# Patient Record
Sex: FEMALE | Race: WHITE | Employment: FULL TIME | URBAN - NONMETROPOLITAN AREA
[De-identification: names, ages, dates, MRNs, and addresses within clinical notes are randomized per-mention and may not be internally consistent; named-entity substitution may affect disease eponyms.]

---

## 2021-10-11 LAB
ABO, EXTERNAL RESULT: NORMAL
C. TRACHOMATIS, EXTERNAL RESULT: NEGATIVE
GBS, EXTERNAL RESULT: NEGATIVE
HEP B, EXTERNAL RESULT: NEGATIVE
HIV, EXTERNAL RESULT: NORMAL
N. GONORRHOEAE, EXTERNAL RESULT: NEGATIVE
RH FACTOR, EXTERNAL RESULT: POSITIVE
RPR, EXTERNAL RESULT: NORMAL
RUBELLA TITER, EXTERNAL RESULT: NORMAL

## 2022-05-09 ENCOUNTER — ANESTHESIA EVENT (OUTPATIENT)
Dept: LABOR AND DELIVERY | Age: 27
End: 2022-05-09
Payer: COMMERCIAL

## 2022-05-09 ENCOUNTER — HOSPITAL ENCOUNTER (INPATIENT)
Age: 27
LOS: 2 days | Discharge: HOME | End: 2022-05-11
Attending: OBSTETRICS & GYNECOLOGY | Admitting: OBSTETRICS & GYNECOLOGY
Payer: COMMERCIAL

## 2022-05-09 ENCOUNTER — ANESTHESIA (OUTPATIENT)
Dept: LABOR AND DELIVERY | Age: 27
End: 2022-05-09
Payer: COMMERCIAL

## 2022-05-09 PROBLEM — Z34.90 TERM PREGNANCY: Status: ACTIVE | Noted: 2022-05-09

## 2022-05-09 LAB
ABSOLUTE EOS #: 0.07 K/UL (ref 0–0.44)
ABSOLUTE IMMATURE GRANULOCYTE: 0.05 K/UL (ref 0–0.3)
ABSOLUTE LYMPH #: 2.41 K/UL (ref 1.1–3.7)
ABSOLUTE MONO #: 0.55 K/UL (ref 0.1–1.2)
AMPHETAMINE SCREEN URINE: NEGATIVE
BARBITURATE SCREEN URINE: NEGATIVE
BASOPHILS # BLD: 0 % (ref 0–2)
BASOPHILS ABSOLUTE: <0.03 K/UL (ref 0–0.2)
BENZODIAZEPINE SCREEN, URINE: NEGATIVE
BUPRENORPHINE URINE: NEGATIVE
CANNABINOID SCREEN URINE: NEGATIVE
COCAINE METABOLITE, URINE: NEGATIVE
EOSINOPHILS RELATIVE PERCENT: 1 % (ref 1–4)
GLUCOSE BLD-MCNC: 60 MG/DL (ref 74–100)
HCT VFR BLD CALC: 38.3 % (ref 36.3–47.1)
HEMOGLOBIN: 12.6 G/DL (ref 11.9–15.1)
IMMATURE GRANULOCYTES: 1 %
LYMPHOCYTES # BLD: 27 % (ref 24–43)
MCH RBC QN AUTO: 30.5 PG (ref 25.2–33.5)
MCHC RBC AUTO-ENTMCNC: 32.9 G/DL (ref 28.4–34.8)
MCV RBC AUTO: 92.7 FL (ref 82.6–102.9)
METHADONE SCREEN, URINE: NEGATIVE
METHAMPHETAMINE, URINE: NEGATIVE
MONOCYTES # BLD: 6 % (ref 3–12)
NRBC AUTOMATED: 0 PER 100 WBC
OPIATES, URINE: NEGATIVE
OXYCODONE SCREEN URINE: NEGATIVE
PDW BLD-RTO: 14 % (ref 11.8–14.4)
PHENCYCLIDINE, URINE: NEGATIVE
PLATELET # BLD: ABNORMAL K/UL (ref 138–453)
PLATELET, FLUORESCENCE: 148 K/UL (ref 138–453)
PLATELET, IMMATURE FRACTION: 7.3 % (ref 1.1–10.3)
PROPOXYPHENE, URINE: NEGATIVE
RBC # BLD: 4.13 M/UL (ref 3.95–5.11)
SEG NEUTROPHILS: 65 % (ref 36–65)
SEGMENTED NEUTROPHILS ABSOLUTE COUNT: 5.7 K/UL (ref 1.5–8.1)
TRICYCLIC ANTIDEPRESSANTS, UR: NEGATIVE
WBC # BLD: 8.8 K/UL (ref 3.5–11.3)

## 2022-05-09 PROCEDURE — 2580000003 HC RX 258: Performed by: MIDWIFE

## 2022-05-09 PROCEDURE — 80306 DRUG TEST PRSMV INSTRMNT: CPT

## 2022-05-09 PROCEDURE — 85025 COMPLETE CBC W/AUTO DIFF WBC: CPT

## 2022-05-09 PROCEDURE — 59200 INSERT CERVICAL DILATOR: CPT

## 2022-05-09 PROCEDURE — 6360000002 HC RX W HCPCS: Performed by: MIDWIFE

## 2022-05-09 PROCEDURE — 6370000000 HC RX 637 (ALT 250 FOR IP): Performed by: MIDWIFE

## 2022-05-09 PROCEDURE — 6360000002 HC RX W HCPCS: Performed by: NURSE ANESTHETIST, CERTIFIED REGISTERED

## 2022-05-09 PROCEDURE — 1220000000 HC SEMI PRIVATE OB R&B

## 2022-05-09 PROCEDURE — 82947 ASSAY GLUCOSE BLOOD QUANT: CPT

## 2022-05-09 RX ORDER — SEVOFLURANE 250 ML/250ML
1 LIQUID RESPIRATORY (INHALATION) CONTINUOUS PRN
Status: DISCONTINUED | OUTPATIENT
Start: 2022-05-09 | End: 2022-05-10

## 2022-05-09 RX ORDER — LAMOTRIGINE 100 MG/1
300 TABLET ORAL 2 TIMES DAILY
COMMUNITY
Start: 2022-04-29

## 2022-05-09 RX ORDER — ACETAMINOPHEN 325 MG/1
650 TABLET ORAL EVERY 4 HOURS PRN
Status: DISCONTINUED | OUTPATIENT
Start: 2022-05-09 | End: 2022-05-10

## 2022-05-09 RX ORDER — NALBUPHINE HCL 10 MG/ML
10 AMPUL (ML) INJECTION
Status: DISCONTINUED | OUTPATIENT
Start: 2022-05-09 | End: 2022-05-10

## 2022-05-09 RX ORDER — SODIUM CHLORIDE 9 MG/ML
25 INJECTION, SOLUTION INTRAVENOUS PRN
Status: DISCONTINUED | OUTPATIENT
Start: 2022-05-09 | End: 2022-05-10

## 2022-05-09 RX ORDER — MISOPROSTOL 100 UG/1
900 TABLET ORAL PRN
Status: DISCONTINUED | OUTPATIENT
Start: 2022-05-09 | End: 2022-05-10

## 2022-05-09 RX ORDER — ONDANSETRON 2 MG/ML
4 INJECTION INTRAMUSCULAR; INTRAVENOUS EVERY 6 HOURS PRN
Status: DISCONTINUED | OUTPATIENT
Start: 2022-05-09 | End: 2022-05-10

## 2022-05-09 RX ORDER — SODIUM CHLORIDE 0.9 % (FLUSH) 0.9 %
5-40 SYRINGE (ML) INJECTION EVERY 12 HOURS SCHEDULED
Status: DISCONTINUED | OUTPATIENT
Start: 2022-05-09 | End: 2022-05-10

## 2022-05-09 RX ORDER — SODIUM CHLORIDE, SODIUM LACTATE, POTASSIUM CHLORIDE, AND CALCIUM CHLORIDE .6; .31; .03; .02 G/100ML; G/100ML; G/100ML; G/100ML
1000 INJECTION, SOLUTION INTRAVENOUS PRN
Status: DISCONTINUED | OUTPATIENT
Start: 2022-05-09 | End: 2022-05-10

## 2022-05-09 RX ORDER — SODIUM CHLORIDE 0.9 % (FLUSH) 0.9 %
5-40 SYRINGE (ML) INJECTION PRN
Status: DISCONTINUED | OUTPATIENT
Start: 2022-05-09 | End: 2022-05-10

## 2022-05-09 RX ORDER — NALOXONE HYDROCHLORIDE 0.4 MG/ML
INJECTION, SOLUTION INTRAMUSCULAR; INTRAVENOUS; SUBCUTANEOUS PRN
Status: DISCONTINUED | OUTPATIENT
Start: 2022-05-09 | End: 2022-05-10

## 2022-05-09 RX ORDER — ROPIVACAINE HYDROCHLORIDE 2 MG/ML
10 INJECTION, SOLUTION EPIDURAL; INFILTRATION; PERINEURAL CONTINUOUS
Status: DISCONTINUED | OUTPATIENT
Start: 2022-05-09 | End: 2022-05-10

## 2022-05-09 RX ORDER — SODIUM CHLORIDE, SODIUM LACTATE, POTASSIUM CHLORIDE, AND CALCIUM CHLORIDE .6; .31; .03; .02 G/100ML; G/100ML; G/100ML; G/100ML
500 INJECTION, SOLUTION INTRAVENOUS PRN
Status: DISCONTINUED | OUTPATIENT
Start: 2022-05-09 | End: 2022-05-10

## 2022-05-09 RX ORDER — TRANEXAMIC ACID 100 MG/ML
INJECTION, SOLUTION INTRAVENOUS
Status: DISCONTINUED
Start: 2022-05-09 | End: 2022-05-10 | Stop reason: WASHOUT

## 2022-05-09 RX ORDER — SODIUM CHLORIDE, SODIUM LACTATE, POTASSIUM CHLORIDE, CALCIUM CHLORIDE 600; 310; 30; 20 MG/100ML; MG/100ML; MG/100ML; MG/100ML
INJECTION, SOLUTION INTRAVENOUS CONTINUOUS
Status: DISCONTINUED | OUTPATIENT
Start: 2022-05-09 | End: 2022-05-10

## 2022-05-09 RX ORDER — CARBOPROST TROMETHAMINE 250 UG/ML
250 INJECTION, SOLUTION INTRAMUSCULAR PRN
Status: DISCONTINUED | OUTPATIENT
Start: 2022-05-09 | End: 2022-05-10

## 2022-05-09 RX ORDER — ROPIVACAINE HYDROCHLORIDE 2 MG/ML
INJECTION, SOLUTION EPIDURAL; INFILTRATION; PERINEURAL PRN
Status: DISCONTINUED | OUTPATIENT
Start: 2022-05-09 | End: 2022-05-10 | Stop reason: SDUPTHER

## 2022-05-09 RX ORDER — METHYLERGONOVINE MALEATE 0.2 MG/ML
200 INJECTION INTRAVENOUS PRN
Status: DISCONTINUED | OUTPATIENT
Start: 2022-05-09 | End: 2022-05-10

## 2022-05-09 RX ORDER — LIDOCAINE HYDROCHLORIDE 10 MG/ML
30 INJECTION, SOLUTION EPIDURAL; INFILTRATION; INTRACAUDAL; PERINEURAL PRN
Status: DISCONTINUED | OUTPATIENT
Start: 2022-05-09 | End: 2022-05-10

## 2022-05-09 RX ADMIN — Medication 25 MCG: at 08:10

## 2022-05-09 RX ADMIN — ROPIVACAINE HYDROCHLORIDE 10 ML/HR: 2 INJECTION, SOLUTION EPIDURAL; INFILTRATION at 19:54

## 2022-05-09 RX ADMIN — Medication 25 MCG: at 12:20

## 2022-05-09 RX ADMIN — ROPIVACAINE HYDROCHLORIDE 10 ML: 2 INJECTION, SOLUTION EPIDURAL; INFILTRATION at 19:53

## 2022-05-09 RX ADMIN — Medication 1 MILLI-UNITS/MIN: at 22:30

## 2022-05-09 RX ADMIN — SODIUM CHLORIDE, POTASSIUM CHLORIDE, SODIUM LACTATE AND CALCIUM CHLORIDE: 600; 310; 30; 20 INJECTION, SOLUTION INTRAVENOUS at 16:49

## 2022-05-09 RX ADMIN — SODIUM CHLORIDE, POTASSIUM CHLORIDE, SODIUM LACTATE AND CALCIUM CHLORIDE: 600; 310; 30; 20 INJECTION, SOLUTION INTRAVENOUS at 22:06

## 2022-05-09 ASSESSMENT — PAIN DESCRIPTION - DESCRIPTORS
DESCRIPTORS: CRAMPING;PRESSURE
DESCRIPTORS: CRAMPING

## 2022-05-09 NOTE — ANESTHESIA PRE PROCEDURE
Department of Anesthesiology  Preprocedure Note       Name:  Mak Najera   Age:  27 y.o.  :  1995                                          MRN:  [id number]         Date:  2022      Surgeon: * No surgeons listed *    Procedure: * No procedures listed *    Medications prior to admission:   Prior to Admission medications    Medication Sig Start Date End Date Taking?  Authorizing Provider   Prenatal MV-Min-Fe Fum-FA-DHA (PRENATAL 1 PO) Take 1 tablet by mouth daily   Yes Historical Provider, MD   lamoTRIgine (LAMICTAL) 100 MG tablet Take 300 mg by mouth 2 times daily 300mg BID 22   Historical Provider, MD       Current medications:    Current Facility-Administered Medications   Medication Dose Route Frequency Provider Last Rate Last Admin    [Held by provider] oxytocin (PITOCIN) 30 units in 500 mL infusion  1 jai-units/min IntraVENous Continuous Wynelle Quale, APRN - CNM        lactated ringers bolus  500 mL IntraVENous PRN Wynelle Quale, APRN - CNM        Or    lactated ringers bolus  1,000 mL IntraVENous PRN Wynelle Quale, APRN - CNM        sodium chloride flush 0.9 % injection 5-40 mL  5-40 mL IntraVENous 2 times per day Wynelle Quale, APRN - CNM        sodium chloride flush 0.9 % injection 5-40 mL  5-40 mL IntraVENous PRN Wynelle Quale, APRN - CNM        0.9 % sodium chloride infusion  25 mL IntraVENous PRN Wynelle Quale, APRN - CNM        lidocaine PF 1 % injection 30 mL  30 mL Other PRN Wynelle Quale, APRN - CNM        nalbuphine (NUBAIN) injection 10 mg  10 mg IntraVENous Q2H PRN Wynelle Quale, APRN - CNM        butorphanol (STADOL) injection 1 mg  1 mg IntraVENous Q3H PRN Wynelle Quale, APRN - CNM        nitrous oxide 50% inhalation 1 each  1 each Inhalation Continuous PRN Wynelle Quale, APRN - CNM        ondansetron (ZOFRAN) injection 4 mg  4 mg IntraVENous Q6H PRN Wynelle Quale, APRN - CNM        oxytocin (PITOCIN) 30 units in 500 mL infusion  166 jai-units/min IntraVENous PRN Ambar Coats, APRN - CNM        And    oxytocin (PITOCIN) 10 unit bolus from the bag  999 mL/hr IntraVENous PRN Rolandoanann Casperne, APRN - CNM        methylergonovine (METHERGINE) injection 200 mcg  200 mcg IntraMUSCular PRN Rolandoanaosiel Benne, APRN - CNM        carboprost (HEMABATE) injection 250 mcg  250 mcg IntraMUSCular PRN Jeanann Benne, APRN - CNM        miSOPROStol (CYTOTEC) tablet 900 mcg  900 mcg Rectal PRN Ambar Benne, APRN - CNM        acetaminophen (TYLENOL) tablet 650 mg  650 mg Oral Q4H PRN Rolandoanaosiel Benne, APRN - CNM        witch hazel-glycerin (TUCKS) pad   Topical PRN Ambar Shirleyne, APRN - CNM        benzocaine-menthol (DERMOPLAST) 20-0.5 % spray   Topical PRN Ambar Shirleyne, APRN - CNM        [Held by provider] lactated ringers infusion   IntraVENous Continuous Rolandoanaosiel Shirleyne, APRN -  mL/hr at 05/09/22 1843 Rate Change at 05/09/22 1843    miSOPROStol (CYTOTEC) pre-split tablet TABS 25 mcg  25 mcg Vaginal Q4H Rolandoanaosiel Benne, APRN - CNM   25 mcg at 05/09/22 1220    lactated ringers infusion   IntraVENous Continuous Jeanann Benne, APRN - CNM        lactated ringers bolus  500 mL IntraVENous PRN Ambar Benne, APRN - CNM        Or    lactated ringers bolus  1,000 mL IntraVENous PRN Rolandoanaosiel Shirleyne, APRN - CNM        oxytocin (PITOCIN) 30 units in 500 mL infusion  1 jai-units/min IntraVENous Continuous Jeanann Casperne, APRN - CNM           Allergies:  No Known Allergies    Problem List:    Patient Active Problem List   Diagnosis Code    Term pregnancy Z34.90       Past Medical History:        Diagnosis Date    Seizure [facility])        Past Surgical History:        Procedure Laterality Date    KNEE SURGERY Left 2011    SHOULDER SURGERY Right 2010    TONSILLECTOMY AND ADENOIDECTOMY         Social History:    Social History     Tobacco Use    Smoking status: Never Smoker    Smokeless tobacco: Never Used   Substance Use Topics    Alcohol use: Never                                Counseling given: Not Answered      Vital Signs (Current):   Vitals:    05/09/22 1218 05/09/22 1220 05/09/22 1631 05/09/22 1737   BP: 121/82 121/82 130/83 120/67   Pulse: 86 86 93 83   Resp:  16 20    Temp:  36.6 °C (97.9 °F) 36.7 °C (98.1 °F)    TempSrc:  Oral Oral    Weight:       Height:                                                  BP Readings from Last 3 Encounters:   05/09/22 120/67       NPO Status:                                                                                 BMI:   Wt Readings from Last 3 Encounters:   05/09/22 245 lb (111.1 kg)     Body mass index is 38.37 kg/m². CBC:   Lab Results   Component Value Date    WBC 8.8 05/09/2022    RBC 4.13 05/09/2022    HGB 12.6 05/09/2022    HCT 38.3 05/09/2022    MCV 92.7 05/09/2022    RDW 14.0 05/09/2022    PLT See Reflexed IPF Result 05/09/2022       CMP: No results found for: NA, K, CL, CO2, BUN, CREATININE, GFRAA, AGRATIO, LABGLOM, GLUCOSE, GLU, PROT, CALCIUM, BILITOT, ALKPHOS, AST, ALT    POC Tests: No results for input(s): POCGLU, POCNA, POCK, POCCL, POCBUN, POCHEMO, POCHCT in the last 72 hours.     Coags: No results found for: PROTIME, INR, APTT    HCG (If Applicable): No results found for: PREGTESTUR, PREGSERUM, HCG, HCGQUANT     ABGs: No results found for: PHART, PO2ART, HME7ZYZ, VCY6JBU, BEART, F5PNXUNA     Type & Screen (If Applicable):  No results found for: LABABO, LABRH    Drug/Infectious Status (If Applicable):  No results found for: HIV, HEPCAB    COVID-19 Screening (If Applicable): No results found for: COVID19        Anesthesia Evaluation  Patient summary reviewed and Nursing notes reviewed no history of anesthetic complications:   Airway: Mallampati: II  TM distance: >3 FB   Neck ROM: full  Mouth opening: > = 3 FB Dental: normal exam         Pulmonary:Negative Pulmonary ROS and normal exam                               Cardiovascular:  Exercise tolerance: good (>4 METS),                     Neuro/Psych:   (+) seizures:,             GI/Hepatic/Renal: Neg GI/Hepatic/Renal ROS            Endo/Other: Negative Endo/Other ROS                    Abdominal:             Vascular: negative vascular ROS. Other Findings:             Anesthesia Plan      epidural     ASA 2             Anesthetic plan and risks discussed with patient. Plan discussed with CRNA.                   Bill Montoya APRN - CRNA   5/9/2022

## 2022-05-09 NOTE — PROGRESS NOTES
Patient states she is ready for her epidural.  To room and SVE is 3/60/-1   Order for bolus to start and she may get her epidural.

## 2022-05-09 NOTE — H&P
Department of Obstetrics and Gynecology   Obstetrics History and Physical  H&P Admission Inpatient  Note        CHIEF COMPLAINT:  Elective induction of labor     HISTORY OF PRESENT ILLNESS:      The patient is a 27 y.o. female at 39w0d. OB History        2    Para   1    Term   1            AB        Living   1       SAB        IAB        Ectopic        Molar        Multiple   0    Live Births   1            Patient presents with a chief complaint as above and is being admitted for induction    Estimated Due Date: Estimated Date of Delivery: 22    PRENATAL CARE:    Complicated by: focal seizures in her leg, no issue during this pregnancy     PAST OB HISTORY:  OB History        2    Para   1    Term   1            AB        Living   1       SAB        IAB        Ectopic        Molar        Multiple   0    Live Births   1                Past Medical History:        Diagnosis Date    Seizure [facility])      Past Surgical History:        Procedure Laterality Date    KNEE SURGERY Left 2011    SHOULDER SURGERY Right 2010    TONSILLECTOMY AND ADENOIDECTOMY       Allergies:  Patient has no known allergies.     Social History:    Social History     Socioeconomic History    Marital status:      Spouse name: Not on file    Number of children: Not on file    Years of education: Not on file    Highest education level: Not on file   Occupational History    Not on file   Tobacco Use    Smoking status: Never Smoker    Smokeless tobacco: Never Used   Vaping Use    Vaping Use: Never used   Substance and Sexual Activity    Alcohol use: Never    Drug use: Never    Sexual activity: Never   Other Topics Concern    Not on file   Social History Narrative    Not on file     Social Determinants of Health     Financial Resource Strain:     Difficulty of Paying Living Expenses: Not on file   Food Insecurity:     Worried About [address] in the Last Year: Not on file    Danika troncoso [employer] in the Last Year: Not on file   Transportation Needs:     Lack of Transportation (Medical): Not on file    Lack of Transportation (Non-Medical):  Not on file   Physical Activity:     Days of Exercise per Week: Not on file    Minutes of Exercise per Session: Not on file   Stress:     Feeling of Stress : Not on file   Social Connections:     Frequency of Communication with Friends and Family: Not on file    Frequency of Social Gatherings with Friends and Family: Not on file    Attends Scientologist Services: Not on file    Active Member of Clubs or Organizations: Not on file    Attends Club or Organization Meetings: Not on file    Marital Status: Not on file   Intimate Partner Violence:     Fear of Current or Ex-Partner: Not on file    Emotionally Abused: Not on file    Physically Abused: Not on file    Sexually Abused: Not on file   Housing Stability:     Unable to Pay for Housing in the Last Year: Not on file    Number of [place] in the Last Year: Not on file    Unstable Housing in the Last Year: Not on file     Family History:       Problem Relation Age of Onset    No Known Problems Mother     No Known Problems Father      Medications Prior to Admission:  Medications Prior to Admission: Prenatal MV-Min-Fe Fum-FA-DHA (PRENATAL 1 PO), Take 1 tablet by mouth daily  lamoTRIgine (LAMICTAL) 100 MG tablet, Take 300 mg by mouth 2 times daily 300mg BID    REVIEW OF SYSTEMS:    CONSTITUTIONAL:  negative  RESPIRATORY:  negative  CARDIOVASCULAR:  negative  GASTROINTESTINAL:  negative  ALLERGIC/IMMUNOLOGIC:  negative  NEUROLOGICAL:  negative  BEHAVIOR/PSYCH:  negative    PHYSICAL EXAM:  Vitals:    05/09/22 0711 05/09/22 1218 05/09/22 1220 05/09/22 1631   BP: 129/76 121/82 121/82 130/83   Pulse: 88 86 86 93   Resp: 16  16 20   Temp: 97.9 °F (36.6 °C)  97.9 °F (36.6 °C) 98.1 °F (36.7 °C)   TempSrc: Oral  Oral Oral   Weight: 245 lb (111.1 kg)      Height: 5' 7\" (1.702 m)        General appearance:  awake, alert, cooperative, no apparent distress, and appears stated age  Neurologic:  Awake, alert, oriented to name, place and time. Lungs:  No increased work of breathing, good air exchange  Abdomen:  Soft, non tender, gravid, consistent with her gestational age, EFW by Fausto's mushtaqouever was 8-0 lbs    Fetal heart rate:  Reassuring. Pelvis:  Adequate pelvis  Cervix: fingertip 50% medium -1  Contraction frequency:  3-7 minutes  Per nursing exam     Membranes:  Intact    Labs:  Blood Type: O+ positive     Rubella:  No results found for: RUBG  T. Pallidium, IGG:  No results found for: TREPG  Sickle Cell Screen: No results found for: SICKLE  Hepatitis B Surface Antigen: No results found for: HEPBSAG  HIV:  No results found for: [id number]       Results for orders placed or performed during the hospital encounter of 05/09/22   GBS, External Result   Result Value Ref Range    GBS, External Result NEGATIVE    C. Trachomatis, External Result   Result Value Ref Range    C. Trachomatis, External Result NEGATIVE    N. Gonorrhoeae, External Result   Result Value Ref Range    N.  Gonorrhoeae, External Result NEGATIVE    DRUG SCREEN MULTI URINE   Result Value Ref Range    Amphetamine Screen, Ur NEGATIVE NEGATIVE    Barbiturate Screen, Ur NEGATIVE NEGATIVE    Benzodiazepine Screen, Urine NEGATIVE NEGATIVE    Cocaine Metabolite, Urine NEGATIVE NEGATIVE    Methadone Screen, Urine NEGATIVE NEGATIVE    Opiates, Urine NEGATIVE NEGATIVE    Phencyclidine, Urine NEGATIVE NEGATIVE    Propoxyphene, Urine NEGATIVE NEGATIVE    Cannabinoid Scrn, Ur NEGATIVE NEGATIVE    Oxycodone Screen, Ur NEGATIVE NEGATIVE    Methamphetamine, Urine NEGATIVE NEGATIVE    Tricyclic Antidepressants, Urine NEGATIVE NEGATIVE    Buprenorphine Urine NEGATIVE NEGATIVE   CBC auto differential   Result Value Ref Range    WBC 8.8 3.5 - 11.3 k/uL    RBC 4.13 3.95 - 5.11 m/uL    Hemoglobin 12.6 11.9 - 15.1 g/dL    Hematocrit 38.3 36.3 - 47.1 %    MCV 92.7 82.6 - 102.9 fL MCH 30.5 25.2 - 33.5 pg    MCHC 32.9 28.4 - 34.8 g/dL    RDW 14.0 11.8 - 14.4 %    Platelets See Reflexed IPF Result 138 - 453 k/uL    NRBC Automated 0.0 0.0 per 100 WBC    Seg Neutrophils 65 36 - 65 %    Lymphocytes 27 24 - 43 %    Monocytes 6 3 - 12 %    Eosinophils % 1 1 - 4 %    Basophils 0 0 - 2 %    Immature Granulocytes 1 (H) 0 %    Segs Absolute 5.70 1.50 - 8.10 k/uL    Absolute Lymph # 2.41 1.10 - 3.70 k/uL    Absolute Mono # 0.55 0.10 - 1.20 k/uL    Absolute Eos # 0.07 0.00 - 0.44 k/uL    Basophils Absolute <0.03 0.00 - 0.20 k/uL    Absolute Immature Granulocyte 0.05 0.00 - 0.30 k/uL   Immature Platelet Fraction   Result Value Ref Range    Platelet, Immature Fraction 7.3 1.1 - 10.3 %    Platelet, Fluorescence 148 138 - 453 k/uL   HIV, External Result   Result Value Ref Range    HIV, External Result NON-REACTIVE    RPR, External Lab   Result Value Ref Range    RPR, External Result NON-REACTIVE    Hepatitis B, External Result   Result Value Ref Range    Hep B, External Result NEGATIVE    Rubella Titer, External Result   Result Value Ref Range    Rubella Titer, External Result IMMUNE    Rh Factor, External Result   Result Value Ref Range    Rh Factor, External Result POSITIVE    ABO, External Result   Result Value Ref Range    ABO, External Result O        ASSESSMENT AND PLAN:    Estimated length of stay: less than 2 nights     Active Problems:    * No active hospital problems. *      Labor: Admit, anticipate normal delivery, routine labor orders  Fetus: Reassuring, Cat 1  GBS: No  Other: IV hydration and antiemetics, plan cytotec for cervical ripening and labor induction, pitocin  Dr Ty Garcia aware of patients admission and plan of care.        Shabana , NICKO - KAMILLE,KAMILLE,5/9/2022 4:48 PM

## 2022-05-09 NOTE — FLOWSHEET NOTE
Janette SIMENTAL called for pt update. States not to put in third dose of cytotec. She will come over and AROM if able.

## 2022-05-09 NOTE — PROGRESS NOTES
CNM to room. Patient resting in bed. SVE 3/50/-1  Sterile amniohook used and CNM performs AROM with a return of moderate amount of clear odorless fluid. Small amount of red blood noted from exam prior to ROM. Patient tolerated procedure well. Fetal heart tones withn normal range before, during and after ROM.   No active bleeding noted after ROM and chux pads replaced per RN

## 2022-05-09 NOTE — FLOWSHEET NOTE
PT SITTING UP AT BEDSIDE PLAYING CARDS WITH  AT THIS TIME. DIFFICULTY TRACING EFM D/T MATERNAL POSITION. EFM HANDHELD, ATTEMPTING TO ADJUST EFM WHILE PT SITTING. AUDIBLE AND PALPABLE FETAL MOVEMENT NOTED.

## 2022-05-09 NOTE — FLOWSHEET NOTE
PT SITTING UP ON BIRTHING BALL AT THIS TIME.  EFM ADJUSTED AT THIS TIME. DIFFICULTY TRACING EFM D/T MATERNAL POSITION. AUDIBLE AND PALPABLE FETAL MOVEMENT NOTED AT THIS TIME.

## 2022-05-10 ENCOUNTER — APPOINTMENT (OUTPATIENT)
Dept: GENERAL RADIOLOGY | Age: 27
End: 2022-05-10
Payer: COMMERCIAL

## 2022-05-10 PROCEDURE — 0KQM0ZZ REPAIR PERINEUM MUSCLE, OPEN APPROACH: ICD-10-PCS | Performed by: MIDWIFE

## 2022-05-10 PROCEDURE — 74018 RADEX ABDOMEN 1 VIEW: CPT

## 2022-05-10 PROCEDURE — 2580000003 HC RX 258: Performed by: MIDWIFE

## 2022-05-10 PROCEDURE — 10907ZC DRAINAGE OF AMNIOTIC FLUID, THERAPEUTIC FROM PRODUCTS OF CONCEPTION, VIA NATURAL OR ARTIFICIAL OPENING: ICD-10-PCS | Performed by: MIDWIFE

## 2022-05-10 PROCEDURE — 7200000001 HC VAGINAL DELIVERY

## 2022-05-10 PROCEDURE — 51702 INSERT TEMP BLADDER CATH: CPT

## 2022-05-10 PROCEDURE — 1220000000 HC SEMI PRIVATE OB R&B

## 2022-05-10 PROCEDURE — 6370000000 HC RX 637 (ALT 250 FOR IP): Performed by: MIDWIFE

## 2022-05-10 PROCEDURE — 3E0P7VZ INTRODUCTION OF HORMONE INTO FEMALE REPRODUCTIVE, VIA NATURAL OR ARTIFICIAL OPENING: ICD-10-PCS | Performed by: MIDWIFE

## 2022-05-10 RX ORDER — SODIUM CHLORIDE 0.9 % (FLUSH) 0.9 %
5-40 SYRINGE (ML) INJECTION EVERY 12 HOURS SCHEDULED
Status: DISCONTINUED | OUTPATIENT
Start: 2022-05-10 | End: 2022-05-11 | Stop reason: HOSPADM

## 2022-05-10 RX ORDER — LAMOTRIGINE 100 MG/1
300 TABLET ORAL 2 TIMES DAILY
Status: DISCONTINUED | OUTPATIENT
Start: 2022-05-10 | End: 2022-05-11 | Stop reason: HOSPADM

## 2022-05-10 RX ORDER — METHYLERGONOVINE MALEATE 0.2 MG/ML
200 INJECTION INTRAVENOUS PRN
Status: DISCONTINUED | OUTPATIENT
Start: 2022-05-10 | End: 2022-05-11 | Stop reason: HOSPADM

## 2022-05-10 RX ORDER — ACETAMINOPHEN 500 MG
1000 TABLET ORAL EVERY 8 HOURS PRN
Status: DISCONTINUED | OUTPATIENT
Start: 2022-05-10 | End: 2022-05-11 | Stop reason: HOSPADM

## 2022-05-10 RX ORDER — CARBOPROST TROMETHAMINE 250 UG/ML
250 INJECTION, SOLUTION INTRAMUSCULAR PRN
Status: DISCONTINUED | OUTPATIENT
Start: 2022-05-10 | End: 2022-05-11 | Stop reason: HOSPADM

## 2022-05-10 RX ORDER — SODIUM CHLORIDE 0.9 % (FLUSH) 0.9 %
5-40 SYRINGE (ML) INJECTION PRN
Status: DISCONTINUED | OUTPATIENT
Start: 2022-05-10 | End: 2022-05-11 | Stop reason: HOSPADM

## 2022-05-10 RX ORDER — SODIUM CHLORIDE 9 MG/ML
INJECTION, SOLUTION INTRAVENOUS PRN
Status: DISCONTINUED | OUTPATIENT
Start: 2022-05-10 | End: 2022-05-11 | Stop reason: HOSPADM

## 2022-05-10 RX ORDER — IBUPROFEN 800 MG/1
800 TABLET ORAL EVERY 8 HOURS PRN
Status: DISCONTINUED | OUTPATIENT
Start: 2022-05-10 | End: 2022-05-11 | Stop reason: HOSPADM

## 2022-05-10 RX ORDER — DOCUSATE SODIUM 100 MG/1
100 CAPSULE, LIQUID FILLED ORAL 2 TIMES DAILY PRN
Status: DISCONTINUED | OUTPATIENT
Start: 2022-05-10 | End: 2022-05-11 | Stop reason: HOSPADM

## 2022-05-10 RX ORDER — ONDANSETRON 4 MG/1
8 TABLET, ORALLY DISINTEGRATING ORAL EVERY 8 HOURS PRN
Status: DISCONTINUED | OUTPATIENT
Start: 2022-05-10 | End: 2022-05-11 | Stop reason: HOSPADM

## 2022-05-10 RX ORDER — MODIFIED LANOLIN
OINTMENT (GRAM) TOPICAL PRN
Status: DISCONTINUED | OUTPATIENT
Start: 2022-05-10 | End: 2022-05-11 | Stop reason: HOSPADM

## 2022-05-10 RX ORDER — MISOPROSTOL 100 UG/1
800 TABLET ORAL PRN
Status: DISCONTINUED | OUTPATIENT
Start: 2022-05-10 | End: 2022-05-11 | Stop reason: HOSPADM

## 2022-05-10 RX ADMIN — LAMOTRIGINE 300 MG: 100 TABLET ORAL at 21:53

## 2022-05-10 RX ADMIN — Medication 999 ML/HR: at 00:27

## 2022-05-10 RX ADMIN — Medication: at 03:45

## 2022-05-10 RX ADMIN — SODIUM CHLORIDE, PRESERVATIVE FREE 5 ML: 5 INJECTION INTRAVENOUS at 09:00

## 2022-05-10 RX ADMIN — LAMOTRIGINE 300 MG: 100 TABLET ORAL at 09:21

## 2022-05-10 RX ADMIN — ACETAMINOPHEN 1000 MG: 500 TABLET, FILM COATED ORAL at 18:24

## 2022-05-10 RX ADMIN — ACETAMINOPHEN 1000 MG: 500 TABLET, FILM COATED ORAL at 10:27

## 2022-05-10 RX ADMIN — IBUPROFEN 800 MG: 800 TABLET, FILM COATED ORAL at 21:29

## 2022-05-10 RX ADMIN — IBUPROFEN 800 MG: 800 TABLET, FILM COATED ORAL at 05:15

## 2022-05-10 RX ADMIN — DOCUSATE SODIUM 100 MG: 100 CAPSULE, LIQUID FILLED ORAL at 09:21

## 2022-05-10 RX ADMIN — IBUPROFEN 800 MG: 800 TABLET, FILM COATED ORAL at 13:07

## 2022-05-10 RX ADMIN — BENZOCAINE AND LEVOMENTHOL: 200; 5 SPRAY TOPICAL at 03:45

## 2022-05-10 ASSESSMENT — PAIN DESCRIPTION - DESCRIPTORS
DESCRIPTORS: SORE
DESCRIPTORS: DISCOMFORT;SORE
DESCRIPTORS: BURNING

## 2022-05-10 ASSESSMENT — PAIN SCALES - GENERAL
PAINLEVEL_OUTOF10: 2
PAINLEVEL_OUTOF10: 6
PAINLEVEL_OUTOF10: 3
PAINLEVEL_OUTOF10: 5

## 2022-05-10 ASSESSMENT — PAIN DESCRIPTION - LOCATION
LOCATION: PERINEUM

## 2022-05-10 NOTE — LACTATION NOTE
Discussed previous experience with  baby. Infant had diaper rash/burns as a result of breastmilk - mom takes lamotrigine and is concerned that the rash was a side effect of the drug. Older child also had a milk protein allergy. At that time, breastfeeding was discontinued and infant had eventually been switched to a soy formula. Lamotrigine looked up on lactmed database and effects on infants as well as recommendation discussed with parents. Infant serum levels should be monitored. Mom's serum levels are checked every six months. Mom states that she will check with pediatrician for how often to check infant serum levels. Mom would also consider feeding infant 50% expressed milk and 50% formula if she feels infant is at risk for high serum levels of medication. Lactation education:    [x] Latch/ good latch vs shallow latch/ steps to obtaining deep latch    [x] How to know if infant is eating enough/ feedings per 24 hours, wet/dirty diapers    [x] Feeding/satiety cues      Lactation education resources given:     [x]  How to Breastfeed your baby - [address] publication      [x]  Follow up support information    [x]  Breast milk storage guidelines - CDC    [x]  Breastpump cleaning guidelines - CDC     [x]  Breastfeeding & Safe Sleep handout - [address] publication    [x]  Calling All Dads! Handout - [address] publication      []  Breast and Nipple Care - [facility]     []  [address]    []  [place]    []  Going Back to Work - [place]    []  Preventing Engorgement - [facility]    Supplies given:    [x]  Brush, soap and basin for breastpump cleaning  - mom has her own pump here to use. []  Insurance pump provided     []  [facility] pump set up for patient to use    Explained to patient, patient verbalizes understanding.         Signed:  Ele John RN, BSN, IBCLC

## 2022-05-10 NOTE — L&D DELIVERY NOTE
Mother's Information    Labor Events    Cervical Ripening:   Now         Julisa, Baby Boy Tin [place] [[id number]]    Labor Events     Labor?: No   Steroids?: None  Cervical Ripening Date/Time:     Antibiotics Received during Labor?: No  Rupture Date/Time: 22 16:39:00   Rupture Type: AROM, Intact  Fluid Color: Clear  Fluid Odor: None  Fluid Volume: Moderate  Induction: Misoprostol  Augmentation: AROM, Oxytocin  Labor Complications: None     Anesthesia    Method: Epidural     Start Pushing    Labor onset date/time:   Now   Dilation complete date/time: 22 23:35:00 EDT Now   Start pushing date/time:    Decision date/time (emergent ):       Delivery ()    Delivery Date/Time:  5/10/22 00:06:00   Delivery Type: Vaginal, Spontaneous    Details:         Presentation    Presentation: Vertex  _: Occiput  _: Posterior     Shoulder Dystocia    Shoulder Dystocia Present?: No  Add Second Maneuver  Add Third Maneuver  Add Fourth Maneuver  Add Fifth Maneuver  Add Sixth Maneuver  Add Seventh Maneuver  Add Eighth Maneuver  Add Ninth Maneuver     Assisted Delivery Details    Forceps Attempted?: No  Vacuum Extractor Attempted?: No     Document Additional Attempt       Document Additional Attempt             Cord    Vessels: 3 Vessels  Complications: None     Placenta    Removal: Spontaneous  Appearance: Intact  Disposition: Discarded     Lacerations    Episiotomy: None  Perineal Lacerations: 2nd  Other Lacerations: labial laceration  Labial Laceration: bilateral Repaired?: No   Number of Repair Packets: 3     Vaginal Counts      Sponges Needles Instruments   Initial Counts      Final Counts      If the count is incorrect due to Intentionally Retained Foreign Object (IRFO) add the IRFO LDA in Lines/Drains.   Add LDA: Link to [facility]     Blood Loss  Mother: Savannah Murphy #[id number]   Start of Mother's Information    Delivery Blood Loss  22 1206 - 05/10/22 0405    None           End of Mother's Information  Mother: Norberto Escoto #[id number]          Delivery Providers    Delivering clinician:      Provider Role     Obstetrician     Primary Nurse     Primary [place] Nurse     NICU Nurse     Neonatologist     Anesthesiologist     Nurse Anesthetist     Nurse Practitioner     Midwife     Nursery Nurse           Assessment     Apgar Scoring Key:    0 1 2    Skin Color: Blue or pale Acrocyanotic Completely pink    Heart Rate: Absent <100 bpm >100 bpm    Reflex Irritability: No response Grimace Cry or active withdrawal    Muscle Tone: Limp Some flexion Active motion    Respiratory Effort: Absent Weak cry; hypoventilation Good, crying                  Skin Color:   Heart Rate:   Reflex Irritability:   Muscle Tone:   Respiratory Effort: Total:            1 Minute:        Apgar 1 total from OB History    5 Minute:        Apgar 5 total from OB History    10 Minute:              15 Minute:              20 Minute:                             Resuscitation            Measurements           Title    Skin to Skin Initiation Date/Time:     Skin to Skin End Date/Time:                Department of Obstetrics and Gynecology  Spontaneous Vaginal Delivery Note          Pre-operative Diagnosis:  Principal Problem:    Term pregnancy  Resolved Problems:    * No resolved hospital problems. *      Post-operative Diagnosis:  Living  infant(s) and Male    Blood Type and Rh: O+ positive     Condition:  infant stable and mother stable remain bonding in delivery room      Details of Procedure: The patient is a 27 y.o. female at 36w3d   OB History        2    Para   1    Term   1            AB        Living   1       SAB        IAB        Ectopic        Molar        Multiple   0    Live Births   1             who was admitted for induction.  She received the following interventions: ARBOW, vaginal Cytotec and IV Pitocin augmentation She was known to be GBS negative and did not receive antibiotic prophylaxis. The patient progressed well,did receive an epidural, became complete and started to push. After pushing for 32 minutes  the fetal head was at the perineum,  the rest of the infant delivered atraumatically, placed on mother abdomen. Nucal Cord was not noted. Cord was clamped and cut per CNM provider per patient request . The delivery of the placenta was spontaneous. Placenta was inspectedintact. The perineum and vagina were explored and a Second degree laceration. Suture used for repair:  Vicryl 3.0. while repairing the 2nd degree the needle broke away from the suture. I was unable to find the needle or feel the needle in the vagina. Pina Cunningham CNM asked to come to the room to assist with retraction to improve visualization. Needle was not felt or seen. I continued to suture to stop bleeding. I then ordered an xray and needle was not found in any of the linens, sponges or on instrument table or floor. Xray showed the needle in the perineum. I called and reported this to Dr Aldair Traylor and she advised to break down the repair and feel for the needle as to where the xray showed it. Repair was taken down, and I gently used my index finger in the second degree and needle felt. I gently lifted and placed my finger under neath and lifted it up and then removed it intact. RNs x2 in room assisting and witnessed removal of the intact needle. 2nd degree repair completed at this time. Patient tolerated well and minimal blood loss noted. Hemostasis noted at the end of the procedure. Bilateral labial lacerations noted, were not bleeding and were not repaired. Dr Aldair Traylor updated with removal of the needle and repair complete.

## 2022-05-10 NOTE — PROGRESS NOTES
Needle removed from vaginal area and disposed of in sharps container. Perlita Mccarthy CNM repairing perineum.

## 2022-05-10 NOTE — PROGRESS NOTES
Catalina Middleton CNM back in room to preform vaginal search for needle post x-ray. Tosha Mckoy RN also in room with writer.

## 2022-05-10 NOTE — PLAN OF CARE
Problem: Vaginal Birth or  Section  Goal: Fetal and maternal status remain reassuring during the birth process  Description:  Birth OB-Pregnancy care plan goal which identifies if the fetal and maternal status remain reassuring during the birth process  Outcome: Completed     Problem: Pain  Goal: Verbalizes/displays adequate comfort level or baseline comfort level  Outcome: Progressing  Flowsheets (Taken 5/10/2022 0800)  Verbalizes/displays adequate comfort level or baseline comfort level:   Encourage patient to monitor pain and request assistance   Assess pain using appropriate pain scale     Problem: Safety - Adult  Goal: Free from fall injury  Outcome: Progressing     Problem: Postpartum  Goal: Experiences normal postpartum course  Description:  Postpartum OB-Pregnancy care plan goal which identifies if the mother is experiencing a normal postpartum course  Outcome: Progressing  Goal: Appropriate maternal -  bonding  Description:  Postpartum OB-Pregnancy care plan goal which identifies if the mother and  are bonding appropriately  Outcome: Progressing  Goal: Establishment of infant feeding pattern  Description:  Postpartum OB-Pregnancy care plan goal which identifies if the mother is establishing a feeding pattern with their   Outcome: Progressing  Goal: Incisions, wounds, or drain sites healing without S/S of infection  Outcome: Progressing     Problem: Infection - Adult  Goal: Absence of infection at discharge  Outcome: Progressing  Goal: Absence of infection during hospitalization  Outcome: Progressing  Goal: Absence of fever/infection during anticipated neutropenic period  Outcome: Progressing     Problem: Discharge Planning  Goal: Discharge to home or other facility with appropriate resources  Outcome: Progressing

## 2022-05-10 NOTE — ANESTHESIA POSTPROCEDURE EVALUATION
Department of Anesthesiology  Postprocedure Note    Patient: Edilberto Elizabeth  MRN: [id number]  YOB: 1995  Date of evaluation: 5/10/2022  Time:  1:44 PM     Procedure Summary     Date: 05/09/22 Room / Location:     Anesthesia Start: 1942 Anesthesia Stop:     Procedure: Labor Analgesia Diagnosis:     Scheduled Providers:  Responsible Provider: NICKO Mansfield CRNA    Anesthesia Type: epidural ASA Status: 2          Anesthesia Type: No value filed. Jose Phase I:      Jose Phase II:      Last vitals: Reviewed and per EMR flowsheets.        Anesthesia Post Evaluation    Patient location during evaluation: bedside  Patient participation: complete - patient participated  Level of consciousness: awake and alert  Airway patency: patent  Nausea & Vomiting: no nausea and no vomiting  Complications: no  Cardiovascular status: blood pressure returned to baseline and hemodynamically stable  Respiratory status: acceptable and room air  Hydration status: euvolemic  Comments: Patient reports all sensory return to baseline, denies any needs at this time

## 2022-05-10 NOTE — ANESTHESIA PROCEDURE NOTES
Epidural Block    Patient location during procedure: patient floor  Start time: 5/9/2022 7:42 PM  End time: 5/9/2022 7:54 PM  Reason for block: labor epidural and at surgeon's request  Staffing  Resident/CRNA: Fabby Bustamante, APRN - CRNA  Preanesthetic Checklist  Completed: patient identified, IV checked, site marked, risks and benefits discussed, surgical consent, monitors and equipment checked, pre-op evaluation, timeout performed, anesthesia consent given, oxygen available and patient being monitored  Epidural  Patient position: sitting  Prep: ChloraPrep  Patient monitoring: cardiac monitor, continuous pulse ox and frequent blood pressure checks  Approach: midline  Location: L2-3  Injection technique: PRESTON air  Provider prep: mask and sterile gloves  Needle  Needle type: Tuohy   Needle gauge: 17 G  Needle length: 3.5 in  Needle insertion depth: 6 cm  Catheter type: side hole  Catheter size: 19 G  Catheter at skin depth: 13 cm  Test dose: negative  Assessment  Sensory level: T10  Hemodynamics: stable  Attempts: 1

## 2022-05-11 VITALS
BODY MASS INDEX: 38.45 KG/M2 | TEMPERATURE: 98 F | OXYGEN SATURATION: 99 % | WEIGHT: 245 LBS | HEART RATE: 72 BPM | SYSTOLIC BLOOD PRESSURE: 126 MMHG | DIASTOLIC BLOOD PRESSURE: 84 MMHG | RESPIRATION RATE: 16 BRPM | HEIGHT: 67 IN

## 2022-05-11 PROCEDURE — 6370000000 HC RX 637 (ALT 250 FOR IP): Performed by: MIDWIFE

## 2022-05-11 RX ADMIN — DOCUSATE SODIUM 100 MG: 100 CAPSULE, LIQUID FILLED ORAL at 07:00

## 2022-05-11 RX ADMIN — Medication 40 EACH: at 10:52

## 2022-05-11 RX ADMIN — LAMOTRIGINE 300 MG: 100 TABLET ORAL at 08:27

## 2022-05-11 RX ADMIN — BENZOCAINE AND LEVOMENTHOL: 200; 5 SPRAY TOPICAL at 10:52

## 2022-05-11 RX ADMIN — IBUPROFEN 800 MG: 800 TABLET, FILM COATED ORAL at 07:00

## 2022-05-11 ASSESSMENT — PAIN SCALES - GENERAL: PAINLEVEL_OUTOF10: 3

## 2022-05-11 ASSESSMENT — PAIN DESCRIPTION - LOCATION: LOCATION: ABDOMEN

## 2022-05-11 ASSESSMENT — PAIN DESCRIPTION - DESCRIPTORS: DESCRIPTORS: CRAMPING

## 2022-05-11 NOTE — FLOWSHEET NOTE
Pt discharged to home with  and infant. Ambulates to personal car per request. Denies further needs.

## 2022-05-11 NOTE — DISCHARGE SUMMARY
Obstetrical Discharge Form        Gestational Age:39w2d    Antepartum complications: focal seizures and on lamictal. NO seizures with the pregnancy    Date of Delivery: 5/10/22    Type of Delivery:        Delivered By:  Halle MAHARAJ CNM    Assisted By:N/A}      Baby: male    Anesthesia:  epidural      Intrapartum complications: None    Feeding method: breast    Results for orders placed or performed during the hospital encounter of 22   GBS, External Result   Result Value Ref Range    GBS, External Result NEGATIVE    C. Trachomatis, External Result   Result Value Ref Range    C. Trachomatis, External Result NEGATIVE    N. Gonorrhoeae, External Result   Result Value Ref Range    N.  Gonorrhoeae, External Result NEGATIVE    DRUG SCREEN MULTI URINE   Result Value Ref Range    Amphetamine Screen, Ur NEGATIVE NEGATIVE    Barbiturate Screen, Ur NEGATIVE NEGATIVE    Benzodiazepine Screen, Urine NEGATIVE NEGATIVE    Cocaine Metabolite, Urine NEGATIVE NEGATIVE    Methadone Screen, Urine NEGATIVE NEGATIVE    Opiates, Urine NEGATIVE NEGATIVE    Phencyclidine, Urine NEGATIVE NEGATIVE    Propoxyphene, Urine NEGATIVE NEGATIVE    Cannabinoid Scrn, Ur NEGATIVE NEGATIVE    Oxycodone Screen, Ur NEGATIVE NEGATIVE    Methamphetamine, Urine NEGATIVE NEGATIVE    Tricyclic Antidepressants, Urine NEGATIVE NEGATIVE    Buprenorphine Urine NEGATIVE NEGATIVE   CBC auto differential   Result Value Ref Range    WBC 8.8 3.5 - 11.3 k/uL    RBC 4.13 3.95 - 5.11 m/uL    Hemoglobin 12.6 11.9 - 15.1 g/dL    Hematocrit 38.3 36.3 - 47.1 %    MCV 92.7 82.6 - 102.9 fL    MCH 30.5 25.2 - 33.5 pg    MCHC 32.9 28.4 - 34.8 g/dL    RDW 14.0 11.8 - 14.4 %    Platelets See Reflexed IPF Result 138 - 453 k/uL    NRBC Automated 0.0 0.0 per 100 WBC    Seg Neutrophils 65 36 - 65 %    Lymphocytes 27 24 - 43 %    Monocytes 6 3 - 12 %    Eosinophils % 1 1 - 4 %    Basophils 0 0 - 2 %    Immature Granulocytes 1 (H) 0 %    Segs Absolute 5.70 1.50 - 8.10 k/uL Absolute Lymph # 2.41 1.10 - 3.70 k/uL    Absolute Mono # 0.55 0.10 - 1.20 k/uL    Absolute Eos # 0.07 0.00 - 0.44 k/uL    Basophils Absolute <0.03 0.00 - 0.20 k/uL    Absolute Immature Granulocyte 0.05 0.00 - 0.30 k/uL   Immature Platelet Fraction   Result Value Ref Range    Platelet, Immature Fraction 7.3 1.1 - 10.3 %    Platelet, Fluorescence 148 138 - 453 k/uL   HIV, External Result   Result Value Ref Range    HIV, External Result NON-REACTIVE    RPR, External Lab   Result Value Ref Range    RPR, External Result NON-REACTIVE    Hepatitis B, External Result   Result Value Ref Range    Hep B, External Result NEGATIVE    Rubella Titer, External Result   Result Value Ref Range    Rubella Titer, External Result IMMUNE    Glucose, Whole Blood   Result Value Ref Range    POC Glucose 60 (L) 74 - 100 mg/dL   Rh Factor, External Result   Result Value Ref Range    Rh Factor, External Result POSITIVE    ABO, External Result   Result Value Ref Range    ABO, External Result O          Postpartum complications: Complication at time of repair. Needle unaccounted for and found by x-ray. Perineal repair opened and needle removed and perineum repaired again.     Discharge Medication:      Medication List      CONTINUE taking these medications    lamoTRIgine 100 MG tablet  Commonly known as: LAMICTAL     PRENATAL 1 PO            Admit date: 5/9/2022  5:55 AM    Discharge Date: 5/11/2022    Discharged to: Home in stable condition        Plan:   Follow up with Mundo Waggoner in 2 weeks

## 2022-05-11 NOTE — FLOWSHEET NOTE
Discharge instructions reviewed with pt. Verb understanding of self care and follow up visit. Denies further needs.

## 2022-05-11 NOTE — PLAN OF CARE
Problem: Pain  Goal: Verbalizes/displays adequate comfort level or baseline comfort level  Outcome: Completed  Flowsheets (Taken 2022 0321)  Verbalizes/displays adequate comfort level or baseline comfort level: Encourage patient to monitor pain and request assistance     Problem: Safety - Adult  Goal: Free from fall injury  Outcome: Completed     Problem: Postpartum  Goal: Experiences normal postpartum course  Description:  Postpartum OB-Pregnancy care plan goal which identifies if the mother is experiencing a normal postpartum course  Outcome: Completed  Goal: Appropriate maternal -  bonding  Description:  Postpartum OB-Pregnancy care plan goal which identifies if the mother and  are bonding appropriately  Outcome: Completed  Goal: Establishment of infant feeding pattern  Description:  Postpartum OB-Pregnancy care plan goal which identifies if the mother is establishing a feeding pattern with their   Outcome: Completed  Goal: Incisions, wounds, or drain sites healing without S/S of infection  Outcome: Completed     Problem: Infection - Adult  Goal: Absence of infection at discharge  Outcome: Completed  Goal: Absence of infection during hospitalization  Outcome: Completed  Goal: Absence of fever/infection during anticipated neutropenic period  Outcome: Completed     Problem: Discharge Planning  Goal: Discharge to home or other facility with appropriate resources  Outcome: Completed

## 2022-05-11 NOTE — LACTATION NOTE
Pt states that infant had been sleepy and she has been pumping and feeding with syringes. The last time she pumped, she obtained more than 22 ml breast milk. Infant appeared to be awake, and latched well without nipple shield,  for a short time, and then she switched to using a shield to keep him more engaged at the breast. Mom reports a very good breastfeed at that time and asks for the expressed milk to be stored. Milk labeled and placed in breastmilk refrigerator.

## 2023-07-07 NOTE — PLAN OF CARE
Problem: Pain  Goal: Verbalizes/displays adequate comfort level or baseline comfort level  Outcome: Progressing  Flowsheets (Taken 5/9/2022 0711)  Verbalizes/displays adequate comfort level or baseline comfort level:   Encourage patient to monitor pain and request assistance   Assess pain using appropriate pain scale Refill request too soon